# Patient Record
Sex: FEMALE | Race: BLACK OR AFRICAN AMERICAN | NOT HISPANIC OR LATINO | URBAN - METROPOLITAN AREA
[De-identification: names, ages, dates, MRNs, and addresses within clinical notes are randomized per-mention and may not be internally consistent; named-entity substitution may affect disease eponyms.]

---

## 2021-06-29 ENCOUNTER — EMERGENCY (EMERGENCY)
Facility: HOSPITAL | Age: 46
LOS: 1 days | Discharge: HOME | End: 2021-06-29
Admitting: HOSPITALIST
Payer: SELF-PAY

## 2021-06-29 VITALS
SYSTOLIC BLOOD PRESSURE: 140 MMHG | RESPIRATION RATE: 16 BRPM | DIASTOLIC BLOOD PRESSURE: 72 MMHG | HEART RATE: 83 BPM | OXYGEN SATURATION: 100 % | TEMPERATURE: 97 F

## 2021-06-29 PROCEDURE — 99053 MED SERV 10PM-8AM 24 HR FAC: CPT

## 2021-06-29 PROCEDURE — 99283 EMERGENCY DEPT VISIT LOW MDM: CPT

## 2021-06-29 NOTE — ED ADULT TRIAGE NOTE - CHIEF COMPLAINT QUOTE
Pt c/o right sided toothache since this weekend. Pt went to urgent care and given antibiotic and Motrin without improvement.

## 2021-06-30 RX ORDER — OXYCODONE AND ACETAMINOPHEN 5; 325 MG/1; MG/1
1 TABLET ORAL ONCE
Refills: 0 | Status: DISCONTINUED | OUTPATIENT
Start: 2021-06-30 | End: 2021-06-30

## 2021-06-30 RX ADMIN — OXYCODONE AND ACETAMINOPHEN 1 TABLET(S): 5; 325 TABLET ORAL at 01:30

## 2021-06-30 NOTE — ED PROVIDER NOTE - PHYSICAL EXAMINATION
mouth/dental: no abnormalities seen, no cracked teeth, no gingiva swelling/tenderness, no facial swelling, + percussion tenderness to tooth # 30-31

## 2021-06-30 NOTE — ED PROVIDER NOTE - PATIENT PORTAL LINK FT
You can access the FollowMyHealth Patient Portal offered by [facility] by registering at the following website: [email or web address] By joining [facility]’s FollowMyHealth portal, you will also be able to view your health information using other applications (apps) compatible with our system.

## 2021-06-30 NOTE — ED PROVIDER NOTE - OBJECTIVE STATEMENT
46 y/o female no pmh p/w c/o sevre right lower right molar t ooth pain x 3 days, was at an urgent care 3 days ago, given motrin 800mg + amoxicillin which has not been helping her, deneis any trauma to the area, denies any HA, neck pain, diff swallowing, excessive drooling, cough, f/c/n/v/d, chest pain, sob, abdominal pain, urinary symptoms, numbness/weakness/tingling, recent travel, sick contact, social history

## 2021-06-30 NOTE — ED PROVIDER NOTE - CLINICAL SUMMARY MEDICAL DECISION MAKING FREE TEXT BOX
dental pain, no dental abscess appreciated, will give percocet, rx few more and have her f/u with dental clinic

## 2023-09-06 NOTE — ED PROVIDER NOTE - MUSCULOSKELETAL, MLM
well developed, well nourished , in no acute distress , ambulating without difficulty , normal communication ability
Spine appears normal, range of motion is not limited, no muscle or joint tenderness